# Patient Record
Sex: MALE | Race: WHITE
[De-identification: names, ages, dates, MRNs, and addresses within clinical notes are randomized per-mention and may not be internally consistent; named-entity substitution may affect disease eponyms.]

---

## 2019-08-12 ENCOUNTER — HOSPITAL ENCOUNTER (OUTPATIENT)
Dept: HOSPITAL 59 - SUR | Age: 74
Discharge: HOME | End: 2019-08-12
Attending: SURGERY
Payer: MEDICARE

## 2019-08-12 DIAGNOSIS — M10.9: ICD-10-CM

## 2019-08-12 DIAGNOSIS — K40.90: Primary | ICD-10-CM

## 2019-08-12 DIAGNOSIS — Z95.5: ICD-10-CM

## 2019-08-12 DIAGNOSIS — I25.10: ICD-10-CM

## 2019-08-12 DIAGNOSIS — I10: ICD-10-CM

## 2019-08-12 DIAGNOSIS — M19.90: ICD-10-CM

## 2019-08-12 DIAGNOSIS — N40.0: ICD-10-CM

## 2019-08-12 DIAGNOSIS — I48.91: ICD-10-CM

## 2019-08-12 DIAGNOSIS — C73: ICD-10-CM

## 2019-08-12 DIAGNOSIS — Z79.01: ICD-10-CM

## 2019-08-12 PROCEDURE — 76942 ECHO GUIDE FOR BIOPSY: CPT

## 2019-08-13 NOTE — OPERATIVE NOTE
DATE OF SURGERY: 08/12/2019 



SURGEON: Franco Espino DO



PREOPERATIVE DIAGNOSIS: Reducible left inguinal hernia. 



POSTOPERATIVE DIAGNOSIS: Reducible left inguinal hernia, direct. 



OPERATION: Open left inguinal herniorrhaphy with mesh. 



PROCEDURE: The patient is a 74-year-old male who was brought to the operating 
room and placed in a supine position. General anesthesia was administered per 
the department of anesthesia. The patient's left inguinal region was prepped and
draped in a sterile fashion. He underwent a preoperative inguinal block per 
department of anesthesia. At this time, the skin was anesthetized with an 
additional 5 mL of 0.25% Sensorcaine with epinephrine. A 4 cm oblique incision 
was made. This was carried down through Sue layer to the aponeurosis of the 
external oblique. This was cleaned off. A nick was made with a scalpel blade and
enlarged through the superficial inguinal ring with Metzenbaum scissors. Care 
was taken not to injure the underlying ilioinguinal nerve or spermatic cord. 
Superior and inferior flaps were developed and a Esme was placed on the 
spermatic cord. This was dissected free from the underlying direct hernia. 
Cremasteric fibers were taken down. There was no indirect hernia, no cord lipoma
noted. The patient had a moderate-to-large size direct hernia noted. This was 
reduced. A large plug was placed in the direct space. This was sutured in with 
2-0 Vicryl. The floor was imbricated with 0 Vicryl. A left-sided ProGrip mesh 
was obtained. This was placed in the floor of the inguinal canal with excellent 
overlap of the pubic tubercle. Sutures went at the level of pubic tubercle, the 
second portion of the inguinal ligament, and the aponeurosis of the internal 
oblique. At this time, the external oblique aponeurosis closed over the cord 
with 2-0 Vicryl, the Sue layer was closed with 3-0 Vicryl, and skin was 
closed with 4-0 Vicryl. The patient was taken to the recovery room in stable 
condition. 



FINDINGS ON SURGERY: Left inguinal hernia, direct, repaired as above. 

MTDD